# Patient Record
Sex: MALE | HISPANIC OR LATINO | URBAN - METROPOLITAN AREA
[De-identification: names, ages, dates, MRNs, and addresses within clinical notes are randomized per-mention and may not be internally consistent; named-entity substitution may affect disease eponyms.]

---

## 2023-02-17 ENCOUNTER — OFFICE VISIT (OUTPATIENT)
Dept: URBAN - METROPOLITAN AREA CLINIC 32 | Facility: CLINIC | Age: 28
End: 2023-02-17
Payer: MEDICAID

## 2023-02-17 DIAGNOSIS — E10.9 TYPE 1 DIABETES MELLITUS W/O COMPLICATION: Primary | ICD-10-CM

## 2023-02-17 DIAGNOSIS — H52.13 MYOPIA, BILATERAL: ICD-10-CM

## 2023-02-17 PROCEDURE — 92004 COMPRE OPH EXAM NEW PT 1/>: CPT | Performed by: OPTOMETRIST

## 2023-02-17 ASSESSMENT — KERATOMETRY
OS: 43.75
OD: 43.13

## 2023-02-17 ASSESSMENT — INTRAOCULAR PRESSURE
OS: 17
OD: 15

## 2023-02-17 ASSESSMENT — VISUAL ACUITY
OS: 20/20
OD: 20/20

## 2023-02-17 NOTE — IMPRESSION/PLAN
Impression: Type 1 diabetes mellitus w/o complication: L72.7. Plan: Diabetes type I: no background retinopathy, no signs of neovascularization noted. Discussed ocular and systemic benefits of blood sugar control.  Return to clinic with Dr. Bonny Hardin in one year for Diabetic eye exam and MAC OCT

## 2024-02-20 ENCOUNTER — OFFICE VISIT (OUTPATIENT)
Dept: URBAN - METROPOLITAN AREA CLINIC 32 | Facility: CLINIC | Age: 29
End: 2024-02-20
Payer: MEDICAID

## 2024-02-20 DIAGNOSIS — E10.9 TYPE 1 DIABETES MELLITUS W/O COMPLICATION: Primary | ICD-10-CM

## 2024-02-20 PROCEDURE — 92014 COMPRE OPH EXAM EST PT 1/>: CPT | Performed by: OPTOMETRIST

## 2024-02-20 ASSESSMENT — INTRAOCULAR PRESSURE
OS: 15
OD: 16

## 2024-02-20 ASSESSMENT — VISUAL ACUITY
OS: 20/20
OD: 20/20

## 2024-02-20 ASSESSMENT — KERATOMETRY
OD: 43.13
OS: 43.38

## 2025-03-27 ENCOUNTER — OFFICE VISIT (OUTPATIENT)
Dept: URBAN - METROPOLITAN AREA CLINIC 32 | Facility: CLINIC | Age: 30
End: 2025-03-27
Payer: MEDICAID

## 2025-03-27 DIAGNOSIS — H52.13 MYOPIA, BILATERAL: ICD-10-CM

## 2025-03-27 DIAGNOSIS — E10.9 TYPE 1 DIABETES MELLITUS W/O COMPLICATION: Primary | ICD-10-CM

## 2025-03-27 PROCEDURE — 92015 DETERMINE REFRACTIVE STATE: CPT | Performed by: OPTOMETRIST

## 2025-03-27 PROCEDURE — 92310 CONTACT LENS FITTING OU: CPT | Performed by: OPTOMETRIST

## 2025-03-27 PROCEDURE — 92014 COMPRE OPH EXAM EST PT 1/>: CPT | Performed by: OPTOMETRIST

## 2025-03-27 ASSESSMENT — VISUAL ACUITY
OS: 20/20
OD: 20/20

## 2025-03-27 ASSESSMENT — INTRAOCULAR PRESSURE
OD: 14
OS: 14